# Patient Record
Sex: MALE | Race: WHITE | NOT HISPANIC OR LATINO | Employment: FULL TIME | URBAN - METROPOLITAN AREA
[De-identification: names, ages, dates, MRNs, and addresses within clinical notes are randomized per-mention and may not be internally consistent; named-entity substitution may affect disease eponyms.]

---

## 2021-05-25 ENCOUNTER — RECORDS - HEALTHEAST (OUTPATIENT)
Dept: ADMINISTRATIVE | Facility: CLINIC | Age: 46
End: 2021-05-25

## 2022-10-12 ENCOUNTER — HOSPITAL ENCOUNTER (OUTPATIENT)
Dept: CT IMAGING | Facility: HOSPITAL | Age: 47
Discharge: HOME | End: 2022-10-12
Attending: UROLOGY | Admitting: UROLOGY
Payer: COMMERCIAL

## 2022-10-12 DIAGNOSIS — R10.2 PELVIC AND PERINEAL PAIN: ICD-10-CM

## 2022-10-12 PROCEDURE — 74177 CT ABD & PELVIS W/CONTRAST: CPT

## 2022-10-12 PROCEDURE — 255N000002 HC RX 255 OP 636: Performed by: UROLOGY

## 2022-10-12 RX ADMIN — IOHEXOL 100 ML: 350 INJECTION, SOLUTION INTRAVENOUS at 12:59

## 2022-10-21 RX ORDER — TERAZOSIN 10 MG/1
20 CAPSULE ORAL AT BEDTIME
COMMUNITY

## 2022-10-21 RX ORDER — SERTRALINE HYDROCHLORIDE 100 MG/1
200 TABLET, FILM COATED ORAL AT BEDTIME
COMMUNITY

## 2022-10-21 RX ORDER — PREDNISONE 10 MG/1
10 TABLET ORAL DAILY
COMMUNITY
End: 2024-04-17

## 2022-10-21 RX ORDER — MONTELUKAST SODIUM 10 MG/1
10 TABLET ORAL AT BEDTIME
COMMUNITY

## 2022-10-22 ENCOUNTER — LAB (OUTPATIENT)
Dept: LAB | Facility: CLINIC | Age: 47
End: 2022-10-22
Payer: COMMERCIAL

## 2022-10-22 DIAGNOSIS — Z20.822 ENCOUNTER FOR LABORATORY TESTING FOR COVID-19 VIRUS: ICD-10-CM

## 2022-10-22 PROCEDURE — U0005 INFEC AGEN DETEC AMPLI PROBE: HCPCS

## 2022-10-22 PROCEDURE — U0003 INFECTIOUS AGENT DETECTION BY NUCLEIC ACID (DNA OR RNA); SEVERE ACUTE RESPIRATORY SYNDROME CORONAVIRUS 2 (SARS-COV-2) (CORONAVIRUS DISEASE [COVID-19]), AMPLIFIED PROBE TECHNIQUE, MAKING USE OF HIGH THROUGHPUT TECHNOLOGIES AS DESCRIBED BY CMS-2020-01-R: HCPCS

## 2022-10-24 ENCOUNTER — ANESTHESIA (OUTPATIENT)
Dept: SURGERY | Facility: HOSPITAL | Age: 47
End: 2022-10-24
Payer: COMMERCIAL

## 2022-10-24 ENCOUNTER — HOSPITAL ENCOUNTER (OUTPATIENT)
Facility: HOSPITAL | Age: 47
Discharge: HOME | End: 2022-10-25
Attending: UROLOGY | Admitting: UROLOGY
Payer: COMMERCIAL

## 2022-10-24 ENCOUNTER — ANESTHESIA EVENT (OUTPATIENT)
Dept: SURGERY | Facility: HOSPITAL | Age: 47
End: 2022-10-24
Payer: COMMERCIAL

## 2022-10-24 LAB — SARS-COV-2 RNA RESP QL NAA+PROBE: NEGATIVE

## 2022-10-24 PROCEDURE — 250N000013 HC RX MED GY IP 250 OP 250 PS 637: Performed by: UROLOGY

## 2022-10-24 PROCEDURE — 250N000009 HC RX 250: Performed by: UROLOGY

## 2022-10-24 PROCEDURE — 250N000011 HC RX IP 250 OP 636: Performed by: NURSE ANESTHETIST, CERTIFIED REGISTERED

## 2022-10-24 PROCEDURE — 250N000009 HC RX 250: Performed by: NURSE ANESTHETIST, CERTIFIED REGISTERED

## 2022-10-24 PROCEDURE — 250N000013 HC RX MED GY IP 250 OP 250 PS 637: Performed by: ANESTHESIOLOGY

## 2022-10-24 PROCEDURE — 250N000011 HC RX IP 250 OP 636: Performed by: UROLOGY

## 2022-10-24 PROCEDURE — 250N000025 HC SEVOFLURANE, PER MIN: Performed by: UROLOGY

## 2022-10-24 PROCEDURE — 250N000011 HC RX IP 250 OP 636: Performed by: NURSE PRACTITIONER

## 2022-10-24 PROCEDURE — 258N000003 HC RX IP 258 OP 636: Performed by: NURSE PRACTITIONER

## 2022-10-24 PROCEDURE — 258N000003 HC RX IP 258 OP 636: Performed by: NURSE ANESTHETIST, CERTIFIED REGISTERED

## 2022-10-24 PROCEDURE — 272N000001 HC OR GENERAL SUPPLY STERILE: Performed by: UROLOGY

## 2022-10-24 PROCEDURE — 258N000003 HC RX IP 258 OP 636: Performed by: ANESTHESIOLOGY

## 2022-10-24 PROCEDURE — 710N000009 HC RECOVERY PHASE 1, LEVEL 1, PER MIN: Performed by: UROLOGY

## 2022-10-24 PROCEDURE — 96372 THER/PROPH/DIAG INJ SC/IM: CPT | Performed by: UROLOGY

## 2022-10-24 PROCEDURE — 250N000011 HC RX IP 250 OP 636: Performed by: ANESTHESIOLOGY

## 2022-10-24 PROCEDURE — 360N000075 HC SURGERY LEVEL 2, PER MIN: Performed by: UROLOGY

## 2022-10-24 PROCEDURE — 999N000141 HC STATISTIC PRE-PROCEDURE NURSING ASSESSMENT: Performed by: UROLOGY

## 2022-10-24 PROCEDURE — 370N000017 HC ANESTHESIA TECHNICAL FEE, PER MIN: Performed by: UROLOGY

## 2022-10-24 RX ORDER — GLYCOPYRROLATE 0.2 MG/ML
INJECTION, SOLUTION INTRAMUSCULAR; INTRAVENOUS PRN
Status: DISCONTINUED | OUTPATIENT
Start: 2022-10-24 | End: 2022-10-24

## 2022-10-24 RX ORDER — LANOLIN ALCOHOL/MO/W.PET/CERES
3 CREAM (GRAM) TOPICAL
Status: DISCONTINUED | OUTPATIENT
Start: 2022-10-24 | End: 2022-10-25 | Stop reason: HOSPADM

## 2022-10-24 RX ORDER — HEPARIN SODIUM 5000 [USP'U]/.5ML
5000 INJECTION, SOLUTION INTRAVENOUS; SUBCUTANEOUS EVERY 12 HOURS
Status: DISCONTINUED | OUTPATIENT
Start: 2022-10-24 | End: 2022-10-25 | Stop reason: HOSPADM

## 2022-10-24 RX ORDER — LISINOPRIL 5 MG/1
10 TABLET ORAL AT BEDTIME
Status: DISCONTINUED | OUTPATIENT
Start: 2022-10-24 | End: 2022-10-25 | Stop reason: HOSPADM

## 2022-10-24 RX ORDER — ACETAMINOPHEN 325 MG/1
975 TABLET ORAL ONCE
Status: COMPLETED | OUTPATIENT
Start: 2022-10-24 | End: 2022-10-24

## 2022-10-24 RX ORDER — SODIUM CHLORIDE, SODIUM LACTATE, POTASSIUM CHLORIDE, CALCIUM CHLORIDE 600; 310; 30; 20 MG/100ML; MG/100ML; MG/100ML; MG/100ML
INJECTION, SOLUTION INTRAVENOUS CONTINUOUS
Status: DISCONTINUED | OUTPATIENT
Start: 2022-10-24 | End: 2022-10-24 | Stop reason: HOSPADM

## 2022-10-24 RX ORDER — NALOXONE HYDROCHLORIDE 1 MG/ML
0.2 INJECTION INTRAMUSCULAR; INTRAVENOUS; SUBCUTANEOUS
Status: DISCONTINUED | OUTPATIENT
Start: 2022-10-24 | End: 2022-10-25 | Stop reason: HOSPADM

## 2022-10-24 RX ORDER — KETOROLAC TROMETHAMINE 30 MG/ML
15 INJECTION, SOLUTION INTRAMUSCULAR; INTRAVENOUS EVERY 6 HOURS PRN
Status: DISCONTINUED | OUTPATIENT
Start: 2022-10-24 | End: 2022-10-24 | Stop reason: HOSPADM

## 2022-10-24 RX ORDER — LORAZEPAM 2 MG/ML
.5-1 INJECTION INTRAMUSCULAR
Status: DISCONTINUED | OUTPATIENT
Start: 2022-10-24 | End: 2022-10-24 | Stop reason: HOSPADM

## 2022-10-24 RX ORDER — NALOXONE HYDROCHLORIDE 1 MG/ML
0.4 INJECTION INTRAMUSCULAR; INTRAVENOUS; SUBCUTANEOUS
Status: DISCONTINUED | OUTPATIENT
Start: 2022-10-24 | End: 2022-10-25 | Stop reason: HOSPADM

## 2022-10-24 RX ORDER — DEXAMETHASONE SODIUM PHOSPHATE 10 MG/ML
INJECTION, SOLUTION INTRAMUSCULAR; INTRAVENOUS PRN
Status: DISCONTINUED | OUTPATIENT
Start: 2022-10-24 | End: 2022-10-24

## 2022-10-24 RX ORDER — OXYCODONE HYDROCHLORIDE 5 MG/1
5 TABLET ORAL EVERY 4 HOURS PRN
Status: DISCONTINUED | OUTPATIENT
Start: 2022-10-24 | End: 2022-10-24 | Stop reason: HOSPADM

## 2022-10-24 RX ORDER — TERAZOSIN 5 MG/1
20 CAPSULE ORAL AT BEDTIME
Status: DISCONTINUED | OUTPATIENT
Start: 2022-10-24 | End: 2022-10-25 | Stop reason: HOSPADM

## 2022-10-24 RX ORDER — MEPERIDINE HYDROCHLORIDE 25 MG/ML
12.5 INJECTION INTRAMUSCULAR; INTRAVENOUS; SUBCUTANEOUS
Status: DISCONTINUED | OUTPATIENT
Start: 2022-10-24 | End: 2022-10-24 | Stop reason: HOSPADM

## 2022-10-24 RX ORDER — LIDOCAINE 40 MG/G
CREAM TOPICAL
Status: DISCONTINUED | OUTPATIENT
Start: 2022-10-24 | End: 2022-10-24 | Stop reason: HOSPADM

## 2022-10-24 RX ORDER — HALOPERIDOL 5 MG/ML
1 INJECTION INTRAMUSCULAR
Status: DISCONTINUED | OUTPATIENT
Start: 2022-10-24 | End: 2022-10-24 | Stop reason: HOSPADM

## 2022-10-24 RX ORDER — MONTELUKAST SODIUM 10 MG/1
10 TABLET ORAL AT BEDTIME
Status: DISCONTINUED | OUTPATIENT
Start: 2022-10-24 | End: 2022-10-25 | Stop reason: HOSPADM

## 2022-10-24 RX ORDER — CLINDAMYCIN PHOSPHATE 900 MG/50ML
900 INJECTION, SOLUTION INTRAVENOUS SEE ADMIN INSTRUCTIONS
Status: DISCONTINUED | OUTPATIENT
Start: 2022-10-24 | End: 2022-10-24 | Stop reason: HOSPADM

## 2022-10-24 RX ORDER — PREDNISONE 5 MG/1
10 TABLET ORAL DAILY
Status: DISCONTINUED | OUTPATIENT
Start: 2022-10-25 | End: 2022-10-25 | Stop reason: HOSPADM

## 2022-10-24 RX ORDER — FENTANYL CITRATE 50 UG/ML
25 INJECTION, SOLUTION INTRAMUSCULAR; INTRAVENOUS EVERY 5 MIN PRN
Status: DISCONTINUED | OUTPATIENT
Start: 2022-10-24 | End: 2022-10-24 | Stop reason: HOSPADM

## 2022-10-24 RX ORDER — PROPOFOL 10 MG/ML
INJECTION, EMULSION INTRAVENOUS PRN
Status: DISCONTINUED | OUTPATIENT
Start: 2022-10-24 | End: 2022-10-24

## 2022-10-24 RX ORDER — ONDANSETRON 2 MG/ML
INJECTION INTRAMUSCULAR; INTRAVENOUS PRN
Status: DISCONTINUED | OUTPATIENT
Start: 2022-10-24 | End: 2022-10-24

## 2022-10-24 RX ORDER — TRAMADOL HYDROCHLORIDE 50 MG/1
50 TABLET ORAL EVERY 6 HOURS PRN
Status: DISCONTINUED | OUTPATIENT
Start: 2022-10-24 | End: 2022-10-25 | Stop reason: HOSPADM

## 2022-10-24 RX ORDER — FENTANYL CITRATE 50 UG/ML
INJECTION, SOLUTION INTRAMUSCULAR; INTRAVENOUS PRN
Status: DISCONTINUED | OUTPATIENT
Start: 2022-10-24 | End: 2022-10-24

## 2022-10-24 RX ORDER — LISINOPRIL 10 MG/1
10 TABLET ORAL AT BEDTIME
COMMUNITY

## 2022-10-24 RX ORDER — MAGNESIUM SULFATE 4 G/50ML
4 INJECTION INTRAVENOUS ONCE
Status: COMPLETED | OUTPATIENT
Start: 2022-10-24 | End: 2022-10-24

## 2022-10-24 RX ORDER — LIDOCAINE HYDROCHLORIDE 10 MG/ML
INJECTION, SOLUTION INFILTRATION; PERINEURAL PRN
Status: DISCONTINUED | OUTPATIENT
Start: 2022-10-24 | End: 2022-10-24

## 2022-10-24 RX ORDER — CLINDAMYCIN PHOSPHATE 900 MG/50ML
900 INJECTION, SOLUTION INTRAVENOUS
Status: COMPLETED | OUTPATIENT
Start: 2022-10-24 | End: 2022-10-24

## 2022-10-24 RX ORDER — FENTANYL CITRATE 50 UG/ML
25 INJECTION, SOLUTION INTRAMUSCULAR; INTRAVENOUS
Status: DISCONTINUED | OUTPATIENT
Start: 2022-10-24 | End: 2022-10-24 | Stop reason: HOSPADM

## 2022-10-24 RX ORDER — ONDANSETRON 2 MG/ML
4 INJECTION INTRAMUSCULAR; INTRAVENOUS EVERY 30 MIN PRN
Status: DISCONTINUED | OUTPATIENT
Start: 2022-10-24 | End: 2022-10-24 | Stop reason: HOSPADM

## 2022-10-24 RX ORDER — HYDROMORPHONE HYDROCHLORIDE 1 MG/ML
0.4 INJECTION, SOLUTION INTRAMUSCULAR; INTRAVENOUS; SUBCUTANEOUS EVERY 5 MIN PRN
Status: DISCONTINUED | OUTPATIENT
Start: 2022-10-24 | End: 2022-10-24 | Stop reason: HOSPADM

## 2022-10-24 RX ORDER — ONDANSETRON 4 MG/1
4 TABLET, ORALLY DISINTEGRATING ORAL EVERY 30 MIN PRN
Status: DISCONTINUED | OUTPATIENT
Start: 2022-10-24 | End: 2022-10-24 | Stop reason: HOSPADM

## 2022-10-24 RX ORDER — LIDOCAINE 40 MG/G
CREAM TOPICAL
Status: DISCONTINUED | OUTPATIENT
Start: 2022-10-24 | End: 2022-10-25 | Stop reason: HOSPADM

## 2022-10-24 RX ADMIN — GENTAMICIN SULFATE 380 MG: 40 INJECTION, SOLUTION INTRAMUSCULAR; INTRAVENOUS at 10:35

## 2022-10-24 RX ADMIN — PHENYLEPHRINE HYDROCHLORIDE 100 MCG: 10 INJECTION INTRAVENOUS at 12:12

## 2022-10-24 RX ADMIN — ACETAMINOPHEN 975 MG: 325 TABLET, FILM COATED ORAL at 10:38

## 2022-10-24 RX ADMIN — PROPOFOL 170 MG: 10 INJECTION, EMULSION INTRAVENOUS at 12:00

## 2022-10-24 RX ADMIN — FENTANYL CITRATE 50 MCG: 50 INJECTION, SOLUTION INTRAMUSCULAR; INTRAVENOUS at 12:03

## 2022-10-24 RX ADMIN — SERTRALINE HYDROCHLORIDE 100 MG: 50 TABLET ORAL at 21:43

## 2022-10-24 RX ADMIN — KETOROLAC TROMETHAMINE 15 MG: 30 INJECTION, SOLUTION INTRAMUSCULAR at 13:02

## 2022-10-24 RX ADMIN — SODIUM CHLORIDE, POTASSIUM CHLORIDE, SODIUM LACTATE AND CALCIUM CHLORIDE: 600; 310; 30; 20 INJECTION, SOLUTION INTRAVENOUS at 10:31

## 2022-10-24 RX ADMIN — FENTANYL CITRATE 50 MCG: 50 INJECTION, SOLUTION INTRAMUSCULAR; INTRAVENOUS at 12:13

## 2022-10-24 RX ADMIN — HEPARIN SODIUM 5000 UNITS: 10000 INJECTION, SOLUTION INTRAVENOUS; SUBCUTANEOUS at 21:43

## 2022-10-24 RX ADMIN — ONDANSETRON 4 MG: 2 INJECTION INTRAMUSCULAR; INTRAVENOUS at 12:18

## 2022-10-24 RX ADMIN — MIDAZOLAM 2 MG: 1 INJECTION INTRAMUSCULAR; INTRAVENOUS at 11:50

## 2022-10-24 RX ADMIN — TERAZOSIN HYDROCHLORIDE 20 MG: 5 CAPSULE ORAL at 21:49

## 2022-10-24 RX ADMIN — GLYCOPYRROLATE 0.2 MG: 0.2 INJECTION, SOLUTION INTRAMUSCULAR; INTRAVENOUS at 12:00

## 2022-10-24 RX ADMIN — LIDOCAINE HYDROCHLORIDE 3 ML: 10 INJECTION, SOLUTION INFILTRATION; PERINEURAL at 12:00

## 2022-10-24 RX ADMIN — DEXAMETHASONE SODIUM PHOSPHATE 10 MG: 10 INJECTION, SOLUTION INTRAMUSCULAR; INTRAVENOUS at 12:05

## 2022-10-24 RX ADMIN — MONTELUKAST SODIUM 10 MG: 10 TABLET, COATED ORAL at 21:43

## 2022-10-24 RX ADMIN — LISINOPRIL 10 MG: 5 TABLET ORAL at 21:43

## 2022-10-24 RX ADMIN — CLINDAMYCIN PHOSPHATE 900 MG: 900 INJECTION, SOLUTION INTRAVENOUS at 11:50

## 2022-10-24 RX ADMIN — MAGNESIUM SULFATE HEPTAHYDRATE 4 G: 80 INJECTION, SOLUTION INTRAVENOUS at 10:31

## 2022-10-24 ASSESSMENT — ACTIVITIES OF DAILY LIVING (ADL)
ADLS_ACUITY_SCORE: 18
WEAR_GLASSES_OR_BLIND: NO
ADLS_ACUITY_SCORE: 18
ADLS_ACUITY_SCORE: 18
CHANGE_IN_FUNCTIONAL_STATUS_SINCE_ONSET_OF_CURRENT_ILLNESS/INJURY: NO
DOING_ERRANDS_INDEPENDENTLY_DIFFICULTY: NO
ADLS_ACUITY_SCORE: 18
WALKING_OR_CLIMBING_STAIRS_DIFFICULTY: NO
ADLS_ACUITY_SCORE: 18
FALL_HISTORY_WITHIN_LAST_SIX_MONTHS: NO
DIFFICULTY_COMMUNICATING: NO
DIFFICULTY_EATING/SWALLOWING: NO
ADLS_ACUITY_SCORE: 18
CONCENTRATING,_REMEMBERING_OR_MAKING_DECISIONS_DIFFICULTY: NO
DRESSING/BATHING_DIFFICULTY: NO
TOILETING_ISSUES: NO
ADLS_ACUITY_SCORE: 18
HEARING_DIFFICULTY_OR_DEAF: NO

## 2022-10-24 NOTE — INTERVAL H&P NOTE
"I have reviewed the surgical (or preoperative) H&P that is linked to this encounter, and examined the patient. There are no significant changes    Clinical Conditions Present on Arrival:  Clinically Significant Risk Factors Present on Admission                    # Overweight: Estimated body mass index is 28.11 kg/m  as calculated from the following:    Height as of this encounter: 1.727 m (5' 8\").    Weight as of this encounter: 83.9 kg (184 lb 14.4 oz).       "

## 2022-10-24 NOTE — ANESTHESIA CARE TRANSFER NOTE
Patient: Dominick Harvey    Procedure: Procedure(s):  CYSTOSCOPY       Diagnosis: Pelvic and perineal pain [R10.2]  Diagnosis Additional Information: No value filed.    Anesthesia Type:   General     Note:    Oropharynx: oropharynx clear of all foreign objects and spontaneously breathing  Level of Consciousness: drowsy  Oxygen Supplementation: face mask  Level of Supplemental Oxygen (L/min / FiO2): 6  Independent Airway: airway patency satisfactory and stable  Dentition: dentition unchanged  Vital Signs Stable: post-procedure vital signs reviewed and stable  Report to RN Given: handoff report given  Patient transferred to: PACU    Handoff Report: Identifed the Patient, Identified the Reponsible Provider, Reviewed the pertinent medical history, Discussed the surgical course, Reviewed Intra-OP anesthesia mangement and issues during anesthesia, Set expectations for post-procedure period and Allowed opportunity for questions and acknowledgement of understanding      Vitals:  Vitals Value Taken Time   /68 10/24/22 1235   Temp 36.8  C (98.2  F) 10/24/22 1235   Pulse 67 10/24/22 1235   Resp 12 10/24/22 1235   SpO2 95 % 10/24/22 1235       Electronically Signed By: ЕКАТЕРИНА Mckeon CRNA  October 24, 2022  12:36 PM

## 2022-10-24 NOTE — OP NOTE
Location: [facility]     Patient Name: Dominick Harvey  Patient : 1975  Patient MRN: [id number]  Patient CSN: [id number]  Patient PCP: No Ref-Primary, Physician    Date of Service: 10/24/22     Pre-operative diagnosis: Pelvic pain    Post-operative diagnosis: Pelvic pain    Procedure:  Cystoscopy    Surgeon: Dr. Cesar Morocho    EBL: 0 mL    Anesthesia: General    Indication: 46 year old male with longstanding abdominal, pelvic, testicular, and scrotal pain that has been attributed to prostatitis and epididymitis in the past. He has not had an urologic work up.  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  Cystoscopy was recommended and he requested that this be done under anesthesia.   Risks, benefits, and alternatives to treatment were discussed with the patient and the patient elected to proceed.    Findings: His urethra was normal. His prostate was short and minimally obstructive.  His bladder was normal without tumors, stones, or trabeculation.  Bilateral ureteral orifices were normal.    Specimens: None    Procedure:  After written informed consent was obtained the patient was brought into the operating room.  The patient was properly identified prior to the procedure, received preprocedure antibiotics, and had sequential compression devices on the legs.  The patient was then induced under anesthesia.    The patient was placed in dorsal lithotomy position and prepped and draped in the usual sterile fashion.    Cystoscopy using the 19 Fr rigid cystoscope was performed. The urethra, prostate, and bladder was evaluated using the 30 degree and 70 degree lenses with the above findings.  I emptied his bladder and removed the cystoscope.    At this point, the procedure was completed.  Patient tolerated the procedure well.    Plan: To the floor for overnight observation (he doesn't live with anyone).  I suspect that his pain  is musculoskeletal in nature and will recommend physical therapy.    Cesar Morocho MD   12:28 PM

## 2022-10-24 NOTE — ANESTHESIA PREPROCEDURE EVALUATION
Anesthesia Pre-Procedure Evaluation    Patient: Dominick Harvey   MRN: [id number] : 1975        Procedure : Procedure(s):  CYSTOSCOPY          History reviewed. No pertinent past medical history.   History reviewed. No pertinent surgical history.   Allergies   Allergen Reactions     Keflex [Cephalexin Monohydrate] Rash      Social History     Tobacco Use     Smoking status: Never     Smokeless tobacco: Never   Substance Use Topics     Alcohol use: Not Currently      Wt Readings from Last 1 Encounters:   10/24/22 83.9 kg (184 lb 14.4 oz)        Anesthesia Evaluation   Pt has not had prior anesthetic         ROS/MED HX  ENT/Pulmonary:  - neg pulmonary ROS     Neurologic:  - neg neurologic ROS     Cardiovascular:  - neg cardiovascular ROS     METS/Exercise Tolerance:     Hematologic:  - neg hematologic  ROS     Musculoskeletal:  - neg musculoskeletal ROS     GI/Hepatic:  - neg GI/hepatic ROS     Renal/Genitourinary: Comment: Chronic prostatitis, bladder spasms.      Endo:  - neg endo ROS     Psychiatric/Substance Use:     (+) psychiatric history depression     Infectious Disease:  - neg infectious disease ROS     Malignancy:  - neg malignancy ROS     Other:  - neg other ROS          Physical Exam    Airway        Mallampati: II   TM distance: > 3 FB   Neck ROM: full     Respiratory Devices and Support         Dental  no notable dental history         Cardiovascular   cardiovascular exam normal          Pulmonary   pulmonary exam normal                OUTSIDE LABS:  CBC:   Lab Results   Component Value Date    WBC 9.0 2006    HGB 16.3 2006    HCT 48.4 2006     2006     BMP: No results found for: NA, POTASSIUM, CHLORIDE, CO2, BUN, CR, GLC  COAGS: No results found for: PTT, INR, FIBR  POC: No results found for: BGM, HCG, HCGS  HEPATIC: No results found for: ALBUMIN, PROTTOTAL, ALT, AST, GGT, ALKPHOS, BILITOTAL, BILIDIRECT, PHU  OTHER: No results found for: PH, LACT, A1C, RONALDO,  PHOS, MAG, LIPASE, AMYLASE, TSH, T4, T3, CRP, SED    Anesthesia Plan    ASA Status:  1   NPO Status:  NPO Appropriate    Anesthesia Type: General.     - Airway: LMA   Induction: Intravenous.   Maintenance: Balanced.        Consents    Anesthesia Plan(s) and associated risks, benefits, and realistic alternatives discussed. Questions answered and patient/representative(s) expressed understanding.    - Discussed:     - Discussed with:  Patient      - Extended Intubation/Ventilatory Support Discussed: No.      - Patient is DNR/DNI Status: No    Use of blood products discussed: No .     Postoperative Care    Pain management: IV analgesics, Oral pain medications, Multi-modal analgesia.   PONV prophylaxis: Dexamethasone or Solumedrol, Ondansetron (or other 5HT-3)     Comments:    Other Comments: Decadron, Zofran.  Diprivan infusion.  Toradol, Tylenol.  Ketamine (0.5 mg/kg).  Magnesium.            Neno Wilkerson MD

## 2022-10-24 NOTE — PHARMACY-ADMISSION MEDICATION HISTORY
Pharmacy Note - Admission Medication History   ______________________________________________________________________    Prior To Admission (PTA) med list completed and updated in EMR.       PTA Med List   Medication Sig Last Dose     lisinopril (ZESTRIL) 10 MG tablet Take 10 mg by mouth At Bedtime 10/23/2022 at hs     montelukast (SINGULAIR) 10 MG tablet Take 10 mg by mouth At Bedtime 10/23/2022 at hs     predniSONE (DELTASONE) 10 MG tablet Take 10 mg by mouth daily Weaning off 10/23/2022 at am     sertraline (ZOLOFT) 100 MG tablet Take 200 mg by mouth At Bedtime 10/23/2022 at hs     terazosin (HYTRIN) 10 MG capsule Take 20 mg by mouth At Bedtime 10/23/2022 at hs       Information source(s): Patient, Clinic records and [facility]    Method of interview communication: in-person    Patient was asked about OTC/herbal products specifically.  PTA med list reflects this.    Based on the pharmacist's assessment, the PTA med list information appears reliable    Allergies were reviewed, assessed, and updated with the patient.      Patient does not use any multi-dose medications prior to admission.     Thank you for the opportunity to participate in the care of this patient.      Stevo Dillon [facility]     10/24/2022     10:13 AM

## 2022-10-24 NOTE — ANESTHESIA PROCEDURE NOTES
Airway       Patient location during procedure: OR  Staff -        CRNA: Eliz Apodaca APRN CRNA       Performed By: CRNA  Consent for Airway        Urgency: elective  Indications and Patient Condition       Indications for airway management: carri-procedural       Induction type:intravenous       Mask difficulty assessment: 1 - vent by mask    Final Airway Details       Final airway type: supraglottic airway    Supraglottic Airway Details        Type: LMA       LMA size: 5    Post intubation assessment        Placement verified by: capnometry and chest rise        Number of attempts at approach: 1       Number of other approaches attempted: 0       Ease of procedure: easy       Dentition: Intact and Unchanged

## 2022-10-24 NOTE — ANESTHESIA POSTPROCEDURE EVALUATION
Patient: Dominick Harvey    Procedure: Procedure(s):  CYSTOSCOPY       Anesthesia Type:  General    Note:  Disposition: Outpatient   Postop Pain Control: Uneventful            Sign Out: Well controlled pain   PONV: No   Neuro/Psych: Uneventful            Sign Out: Acceptable/Baseline neuro status   Airway/Respiratory: Uneventful            Sign Out: Acceptable/Baseline resp. status   CV/Hemodynamics: Uneventful            Sign Out: Acceptable CV status; No obvious hypovolemia; No obvious fluid overload   Other NRE: NONE   DID A NON-ROUTINE EVENT OCCUR? No    Event details/Postop Comments:  Doing well and recovering nicely.  No complaints.           Last vitals:  Vitals Value Taken Time   /73 10/24/22 1315   Temp 36.8  C (98.2  F) 10/24/22 1235   Pulse 68 10/24/22 1325   Resp 8 10/24/22 1325   SpO2 93 % 10/24/22 1325   Vitals shown include unvalidated device data.    Electronically Signed By: Neno Wilkerson MD  October 24, 2022  1:27 PM

## 2022-10-24 NOTE — LETTER
[address] [address]  Phone: [phone]  Fax: [phone]    October 25, 2022        Dominick Harvey  [address] [place] [id number]          To whom it may concern:    RE: Dominick Harvey    Patient was seen and treated today at [facility] 10/24-10/25/22. Mr. Harvey may return to work without restrictions.     Please contact me for questions or concerns.      Sincerely,        Beny Gonsalves, APRN, CNP  [phone]

## 2022-10-25 VITALS
DIASTOLIC BLOOD PRESSURE: 60 MMHG | WEIGHT: 184.9 LBS | SYSTOLIC BLOOD PRESSURE: 124 MMHG | HEART RATE: 67 BPM | TEMPERATURE: 97.8 F | BODY MASS INDEX: 28.02 KG/M2 | HEIGHT: 68 IN | RESPIRATION RATE: 20 BRPM | OXYGEN SATURATION: 97 %

## 2022-10-25 LAB — GLUCOSE BLDC GLUCOMTR-MCNC: 130 MG/DL (ref 70–99)

## 2022-10-25 PROCEDURE — 82962 GLUCOSE BLOOD TEST: CPT

## 2022-10-25 PROCEDURE — 250N000012 HC RX MED GY IP 250 OP 636 PS 637: Performed by: UROLOGY

## 2022-10-25 PROCEDURE — 250N000013 HC RX MED GY IP 250 OP 250 PS 637: Performed by: STUDENT IN AN ORGANIZED HEALTH CARE EDUCATION/TRAINING PROGRAM

## 2022-10-25 RX ADMIN — Medication 3 MG: at 00:35

## 2022-10-25 RX ADMIN — PREDNISONE 10 MG: 5 TABLET ORAL at 09:49

## 2022-10-25 ASSESSMENT — ACTIVITIES OF DAILY LIVING (ADL)
ADLS_ACUITY_SCORE: 18

## 2022-10-25 NOTE — DISCHARGE SUMMARY
[facility] DISCHARGE SUMMARY    Name: Dominick Harvey  : 1975  MRN: [id number]  PCP: No Ref-Primary, Physician    Place of service: [facility]    Admission date: 10/24/2022   Discharge date: 10/25/2022     Surgeon: Dr Cesar Morocho     Surgical Procedure:  Cystoscopy     Date of surgery: 10/24/2022    Principal Diagnosis at Discharge:   Pelvic and perineal pain [R10.2]    Other diagnosis addressed during hospitalization:  None    Consults:  None    Diagnostic Studies :  None    Complications while in the Hospital:  None    Physical Exam:  Temp: 97.8  F (36.6  C) Temp src: Oral BP: 124/60 Pulse: 67   Resp: 20 SpO2: 97 % O2 Device: None (Room air) Oxygen Delivery: 8 LPM    Gen: nad, alert  Neuro: A&O x 3. Moving all extremities equally.   Resp: Reg resp rate/depth.   Abdomen: Soft, not significantly tender.   : penis and scrotum without erythema or edema  LE: CWMS intact.    Brief history of hospital course:  This is a 46 year old male admitted for Pelvic and perineal pain [R10.2]. Patient underwent above procedure. Patient tolerated the procedure well. Post operative course was unremarkable. By the day of discharge, the patient was ambulatory, tolerating diet, without pain, labs and vitals stable, voiding without difficulty.     Labs:  None     DISPOSITION: Home    DISCHARGE CONDITION: Good/Stable    DISCHARGE MEDICATIONS:      Review of your medicines      CONTINUE these medicines which have NOT CHANGED      Dose / Directions   lisinopril 10 MG tablet  Commonly known as: ZESTRIL      Dose: 10 mg  Take 10 mg by mouth At Bedtime  Refills: 0     montelukast 10 MG tablet  Commonly known as: SINGULAIR      Dose: 10 mg  Take 10 mg by mouth At Bedtime  Refills: 0     predniSONE 10 MG tablet  Commonly known as: DELTASONE      Dose: 10 mg  Take 10 mg by mouth daily Weaning off  Refills: 0     sertraline 100 MG tablet  Commonly known as: ZOLOFT      Dose: 200 mg  Take 200 mg by mouth At  Bedtime  Refills: 0     terazosin 10 MG capsule  Commonly known as: HYTRIN      Dose: 20 mg  Take 20 mg by mouth At Bedtime  Refills: 0            DISCHARGE PLAN:   - Follow up with Dr Cesar Morocho as needed. [facility] and/or PT will call you to arrange pelvic floor physical therapy.   - Follow up with PCP as needed, discuss screening colonoscopy.   - Resume home medications.   - Physical activity: Resume normal activities.   - Diet: Regular diet.   - Medication: please review discharge Med req/AVS  - Warning signs discussed with patient about when to call the clinic/hospital  - All questions and concerns were answered for the patient prior to discharge  - Patient verbalized understanding.     Discussed patient with Dr Cesar Morocho on day of discharge.     Beny Gonsalves APRN, CNP  [facility]   [phone]     I saw the patient on the date of discharge  Total time spent for discharge on date of discharge: 20 minutes    Physician(s) in addition to primary physician who should receive a copy:  CC1: No Ref-Primary, Physician           ?

## 2022-10-25 NOTE — PLAN OF CARE
"Goal Outcome Evaluation:               VSS.  Pain with voiding.  States \" feels like razor blades\". Monitoring output. IV SL.  Tolerating diet. Melatonin for sleep.  Care plan reviewed.   Problem: Plan of Care - These are the overarching goals to be used throughout the patient stay.    Goal: Plan of Care Review  Description: The Plan of Care Review/Shift note should be completed every shift.  The Outcome Evaluation is a brief statement about your assessment that the patient is improving, declining, or no change.  This information will be displayed automatically on your shift note.  Outcome: Progressing  Goal: Patient-Specific Goal (Individualized)  Description: You can add care plan individualizations to a care plan. Examples of Individualization might be:  \"Parent requests to be called daily at 9am for status\", \"I have a hard time hearing out of my right ear\", or \"Do not touch me to wake me up as it startles me\".  Outcome: Progressing  Goal: Absence of Hospital-Acquired Illness or Injury  Outcome: Progressing  Intervention: Identify and Manage Fall Risk  Recent Flowsheet Documentation  Taken 10/25/2022 0000 by Maggie Herman RN  Safety Promotion/Fall Prevention: nonskid shoes/slippers when out of bed  Intervention: Prevent Skin Injury  Recent Flowsheet Documentation  Taken 10/25/2022 0000 by Maggie Herman RN  Body Position: position changed independently  Goal: Optimal Comfort and Wellbeing  Outcome: Progressing  Goal: Readiness for Transition of Care  Outcome: Progressing     Problem: Pain Acute  Goal: Optimal Pain Control and Function  Outcome: Progressing            "

## 2022-10-25 NOTE — PLAN OF CARE
Patient anxious to discharge.  Requesting md be notified.  Urology called will come see patient soon.

## 2022-10-25 NOTE — PLAN OF CARE
Goal Outcome Evaluation:      Plan of Care Reviewed With: patient    Overall Patient Progress: improvingOverall Patient Progress: improving    Outcome Evaluation: Pt voiding adequately.    Pt voiding in urinal 1150 mls this shift of clear samia urine. Pt tolerated reg diet and ordered independently ate 100%. Pt able to ambulate in the halls independently.

## 2022-10-25 NOTE — CARE PLAN
RN  Discussed discharge instructions with patient.  Patient verbalized understanding and voiced no concerns at this time.  AVS provided to patient.  Belongings returned.  No significant changes in presentation.  Patient discharged to home with family.

## 2024-04-17 ENCOUNTER — HOSPITAL ENCOUNTER (EMERGENCY)
Facility: CLINIC | Age: 49
Discharge: HOME | End: 2024-04-17
Attending: EMERGENCY MEDICINE | Admitting: EMERGENCY MEDICINE
Payer: COMMERCIAL

## 2024-04-17 VITALS
HEART RATE: 67 BPM | OXYGEN SATURATION: 97 % | SYSTOLIC BLOOD PRESSURE: 145 MMHG | RESPIRATION RATE: 16 BRPM | DIASTOLIC BLOOD PRESSURE: 76 MMHG | TEMPERATURE: 98.5 F

## 2024-04-17 DIAGNOSIS — M25.552 HIP PAIN, LEFT: ICD-10-CM

## 2024-04-17 PROBLEM — R41.9 COGNITIVE COMPLAINTS: Status: ACTIVE | Noted: 2018-03-16

## 2024-04-17 PROBLEM — F51.01 PRIMARY INSOMNIA: Status: ACTIVE | Noted: 2024-04-17

## 2024-04-17 PROBLEM — G89.29 OTHER CHRONIC PAIN: Status: ACTIVE | Noted: 2024-04-17

## 2024-04-17 PROBLEM — F07.81 POST CONCUSSIVE SYNDROME: Status: ACTIVE | Noted: 2018-03-16

## 2024-04-17 PROBLEM — R10.2 PELVIC AND PERINEAL PAIN: Status: ACTIVE | Noted: 2022-10-07

## 2024-04-17 PROBLEM — F43.9 STRESS: Status: ACTIVE | Noted: 2018-03-16

## 2024-04-17 PROBLEM — N50.819 PAIN IN TESTICLE: Status: ACTIVE | Noted: 2022-10-07

## 2024-04-17 PROBLEM — R36.9 URETHRAL DISCHARGE: Status: ACTIVE | Noted: 2024-04-17

## 2024-04-17 PROBLEM — Q89.01 ASPLENIA: Status: ACTIVE | Noted: 2019-10-08

## 2024-04-17 PROBLEM — I10 ESSENTIAL HYPERTENSION: Status: ACTIVE | Noted: 2024-04-17

## 2024-04-17 PROBLEM — Z63.6 CAREGIVER BURDEN: Status: ACTIVE | Noted: 2018-11-02

## 2024-04-17 PROBLEM — Z72.820 POOR SLEEP: Status: ACTIVE | Noted: 2018-03-16

## 2024-04-17 PROCEDURE — 96372 THER/PROPH/DIAG INJ SC/IM: CPT

## 2024-04-17 PROCEDURE — 99284 EMERGENCY DEPT VISIT MOD MDM: CPT

## 2024-04-17 PROCEDURE — 250N000011 HC RX IP 250 OP 636

## 2024-04-17 RX ORDER — KETOROLAC TROMETHAMINE 30 MG/ML
30 INJECTION, SOLUTION INTRAMUSCULAR; INTRAVENOUS ONCE
Status: COMPLETED | OUTPATIENT
Start: 2024-04-17 | End: 2024-04-17

## 2024-04-17 RX ORDER — PREDNISONE 10 MG/1
TABLET ORAL
Qty: 74 TABLET | Refills: 0 | Status: SHIPPED | OUTPATIENT
Start: 2024-04-17 | End: 2024-05-22

## 2024-04-17 RX ADMIN — KETOROLAC TROMETHAMINE 30 MG: 30 INJECTION, SOLUTION INTRAMUSCULAR at 11:03

## 2024-04-17 ASSESSMENT — COLUMBIA-SUICIDE SEVERITY RATING SCALE - C-SSRS
1. IN THE PAST MONTH, HAVE YOU WISHED YOU WERE DEAD OR WISHED YOU COULD GO TO SLEEP AND NOT WAKE UP?: NO
6. HAVE YOU EVER DONE ANYTHING, STARTED TO DO ANYTHING, OR PREPARED TO DO ANYTHING TO END YOUR LIFE?: NO
2. HAVE YOU ACTUALLY HAD ANY THOUGHTS OF KILLING YOURSELF IN THE PAST MONTH?: NO

## 2024-04-17 ASSESSMENT — ACTIVITIES OF DAILY LIVING (ADL)
ADLS_ACUITY_SCORE: 33
ADLS_ACUITY_SCORE: 35

## 2024-04-17 NOTE — DISCHARGE INSTRUCTIONS
You were seen in the Emergency Room for L hip pain. You received pain medications and improved. We will send you home with a steroid taper dose, in which you will take 40mg for 7 days, then 30 mg for 7 days, then 20mg for 7 days, then 10mg for 7 days, then 5mg for 7 days. Please follow-up with your spine specialist and primary care doctor as needed.

## 2024-04-17 NOTE — ED TRIAGE NOTES
Chronic pain that is flared up, was given a taper of prednisone. Was unable to stand this morning due to the pain. Will be seeing a MD spine specialist tomorrow. Requesting an MRI.      JINA GRIGSBY RN       Triage Assessment (Adult)       Row Name 04/17/24 0928          Triage Assessment    Airway WDL WDL        Respiratory WDL    Respiratory WDL WDL        Skin Circulation/Temperature WDL    Skin Circulation/Temperature WDL WDL        Cardiac WDL    Cardiac WDL WDL        Peripheral/Neurovascular WDL    Peripheral Neurovascular WDL X  tingling in the left foot at times        Cognitive/Neuro/Behavioral WDL    Cognitive/Neuro/Behavioral WDL WDL

## 2024-04-17 NOTE — ED PROVIDER NOTES
EMERGENCY DEPARTMENT ENCOUNTER      NAME: Dominick Harvey  AGE: 48 year old male  YOB: 1975  MRN: [id number]  EVALUATION DATE & TIME: 2024  9:34 AM    PCP: No Ref-Primary, Physician    ED PROVIDER: Anthony Goodson D.O.      Chief Complaint   Patient presents with    Hip Pain       FINAL IMPRESSION:  1. Hip pain, left        ED COURSE & MEDICAL DECISION MAKIN:37 AM I met with the patient to gather history and to perform my initial exam. I discussed the plan for care while in the Emergency Department.         Pertinent Labs & Imaging studies reviewed. (See chart for details)  48 year old male presents to the Emergency Department for evaluation of chronic left hip pain.  No recent trauma do not find indication x-rays as fracture or dislocation are unlikely.  Patient has no fever, no swelling, no erythema over the joint area to suggest concern for septic arthritis.  Pain is primarily over the piriformis on exam, which she has a history of chronic inflammation.  Has done well on oral steroids in the past.  At this time I do not believe imaging is indicated but do believe that we can discharge on a tapering dose steroids and have follow-up as an outpatient.  Patient was comfortable with this plan.  Return precautions were discussed.    Medical Decision Making  Obtained supplemental history:Supplemental history obtained?: No  Reviewed external records: External records reviewed?: Outpatient Record: [facility]  Care impacted by chronic illness:Hypertension  Care significantly affected by social determinants of health:N/A  Did you consider but not order tests?: Work up considered but not performed and documented in chart, if applicable  Did you interpret images independently?: Independent interpretation of ECG and images noted in documentation, when applicable.  Consultation discussion with other provider:Did you involve another provider (consultant, ,  "pharmacy, etc.)?: No  Discharge. I prescribed additional prescription strength medication(s) as charted. See documentation for any additional details.    At the conclusion of the encounter I discussed the results of all of the tests and the disposition. The questions were answered. The patient or family acknowledged understanding and was agreeable with the care plan.        [facility]    Patient information was obtained from: patient     Use of : N/A        Dominick Harvey is a 48 year old male who presents with hip pain.    The patient has chronic hip pain with a family history of \"inflammatory disease\". For the last month the left hip pain  has been worse than usual. His previous PCP had prescribed him steroids when he had flare ups so he went to his new PCP to request this. They prescribed him a steroid taper that started at 40 mg and went down to 20 mg a few days ago. When he went down to the 20 mg dose he had 10/10 hip pain. This morning, he took 40 mg and his pain dropped down to a 7/10.     He denies numbness, tingling, or any other complaints at this time.     Per Chart Review:   The patient was seen on 4/10/2024 by his PCP at [facility] for his chronic left hip pain. He was started on a Prednisone taper.       REVIEW OF SYSTEMS  Constitutional:  Denies fever, chills, weight loss or weakness  Eyes:  No pain, discharge, redness  HENT:  Denies sore throat, ear pain, congestion  Respiratory: No SOB, wheeze or cough  Cardiovascular:  No CP, palpitations  GI:  Denies abdominal pain, nausea, vomiting, diarrhea  : Denies dysuria, hematuria  Musculoskeletal:  Denies any new swelling or loss of function. Positive for left hip pain.  Skin:  Denies rash, pallor  Neurologic:  Denies headache, focal weakness or sensory changes  Lymph: Denies swollen nodes    All other systems negative unless noted in HPI.    PAST MEDICAL HISTORY:  No past medical history on file.    PAST SURGICAL " HISTORY:  Past Surgical History:   Procedure Laterality Date    CYSTOSCOPY N/A 10/24/2022    Procedure: CYSTOSCOPY;  Surgeon: Cesar Morocho MD;  Location: [facility] Main OR         CURRENT MEDICATIONS:    No current facility-administered medications for this encounter.     Current Outpatient Medications   Medication Sig Dispense Refill    predniSONE (DELTASONE) 10 MG tablet Take 4 tablets (40 mg) by mouth daily for 7 days, THEN 3 tablets (30 mg) daily for 7 days, THEN 2 tablets (20 mg) daily for 7 days, THEN 1 tablet (10 mg) daily for 7 days, THEN 0.5 tablets (5 mg) daily for 7 days. 74 tablet 0    lisinopril (ZESTRIL) 10 MG tablet Take 10 mg by mouth At Bedtime      montelukast (SINGULAIR) 10 MG tablet Take 10 mg by mouth At Bedtime      sertraline (ZOLOFT) 100 MG tablet Take 200 mg by mouth At Bedtime      terazosin (HYTRIN) 10 MG capsule Take 20 mg by mouth At Bedtime           ALLERGIES:  Allergies   Allergen Reactions    Keflex [Cephalexin Monohydrate] Rash     Occurred as a child. Was not very severe per patient testimonial        FAMILY HISTORY:  No family history on file.    SOCIAL HISTORY:  Social History     Socioeconomic History    Marital status: Single   Tobacco Use    Smoking status: Never    Smokeless tobacco: Never   Substance and Sexual Activity    Alcohol use: Not Currently    Drug use: Never       VITALS:  Patient Vitals for the past 24 hrs:   BP Temp Temp src Pulse Resp SpO2   04/17/24 1107 (!) 145/76 -- -- 67 16 97 %   04/17/24 0926 (!) 146/85 98.5  F (36.9  C) Oral 106 16 97 %       PHYSICAL EXAM    VITAL SIGNS: BP (!) 145/76   Pulse 67   Temp 98.5  F (36.9  C) (Oral)   Resp 16   SpO2 97%   Vitals: BP (!) 145/76   Pulse 67   Temp 98.5  F (36.9  C) (Oral)   Resp 16   SpO2 97%   General: Appears in no acute distress, awake, alert, interactive.  Eyes: Conjunctivae non-injected. Sclera anicteric.  HENT: Atraumatic, normocephalic  Neck: Supple, normal ROM  Respiratory/Chest:  Respiration unlabored, no tachypnea  Abdomen: non distended  Musculoskeletal: Normal extremities. No edema or erythema. Tenderness to left buttock piriformis.  Full passive range of motion with minimal discomfort  Skin: Normal color. No rash or diaphoresis.  Neurologic: Alert and oriented, face symmetric, moves all extremities spontaneously. Speech clear. 5/5 motor tone in bilateral legs. No deficits.  Psychiatric:  Affect normal, Judgment normal, Mood normal.       LABS  No results found for this or any previous visit (from the past 24 hour(s)).      RADIOLOGY  No orders to display              MEDICATIONS GIVEN IN THE EMERGENCY:  Medications   ketorolac (TORADOL) injection 30 mg (30 mg Intramuscular $Given 4/17/24 1103)       NEW PRESCRIPTIONS STARTED AT TODAY'S ER VISIT  Discharge Medication List as of 4/17/2024 11:46 AM           I, Trevor Pastor , am serving as a scribe to document services personally performed by Anthony Goodson D.O., based on my observations and the provider's statements to me.  I, Anthony Goodson D.O., attest that Trevor Pastor is acting in a scribe capacity, has observed my performance of the services and has documented them in accordance with my direction.     Anthony Goodson D.O.  Emergency Medicine  [facility] EMERGENCY ROOM  [address] [id number] [phone]  Dept: [phone]       Anthony Goodson,   04/17/24 1164

## 2024-04-17 NOTE — ED PROVIDER NOTES
EMERGENCY DEPARTMENT ENCOUNTER      NAME: Dominick Harvey  AGE: 48 year old male  YOB: 1975  MRN: [id number]  EVALUATION DATE & TIME: 4/17/2024  9:34 AM    PCP: No Ref-Primary, Physician    ED PROVIDER: Duane Tate MD      Chief Complaint   Patient presents with    Hip Pain       FINAL IMPRESSION:  1. Hip pain, left          ED COURSE & MEDICAL DECISION MAKING:    Pertinent Labs & Imaging studies reviewed. (See chart for details)  48 year old male presents to the Emergency Department for evaluation of acute on chronic L hip pain. Previously has controlled flares with 40mg prednisone. Was recently seen by PCP for steroid taper and noted improvement of hip pain at 40mg, however worsened when he tapered down to 20mg. Went back to 40mg prior to ED arrival with some improvement of pain. Suspect this pain is L piriformis syndrome or SI joint pain. No radiculopathy today. Low concern for cauda equina or other urgent pathology given negative red flag symptoms. No imaging warranted. Will treat pain with 1 time dose of toradol and send home with weekly steroid taper of 40mg/30mg/20mg/10mg/5mg. Patient in agreement of this.   9:34AM: met with Patient and obtained history and exam  11:27AM: check in on patient; pain improved. Will discharge home with steroid taper as above.       At the conclusion of the encounter I discussed the results of all of the tests and the disposition. The questions were answered. The patient or family acknowledged understanding and was agreeable with the care plan.     Patient seen and discussed with Dr. Anthony Tate MD, MPH   PGY-1  [facility]   Emergency Medicine     MEDICATIONS GIVEN IN THE EMERGENCY:  Medications   ketorolac (TORADOL) injection 30 mg (30 mg Intramuscular $Given 4/17/24 1105)       NEW PRESCRIPTIONS STARTED AT TODAY'S ER VISIT  New Prescriptions    PREDNISONE (DELTASONE) 10 MG TABLET    Take 4 tablets (40 mg) by  mouth daily for 7 days, THEN 3 tablets (30 mg) daily for 7 days, THEN 2 tablets (20 mg) daily for 7 days, THEN 1 tablet (10 mg) daily for 7 days, THEN 0.5 tablets (5 mg) daily for 7 days.          =================================================================    HPI    Dominick Harvey is a 48 year old male with a pertinent history of chronic left hip pain who presents to this ED  for evaluation of ongoing left hip pain. Reports he's had a chronic issue with his L hip pain. Has an inflammatory condition that runs in the family and would frequently get flare-ups of his hip pain that was previously controlled with prednisone PRN. Reports he's been having worsening L hip pain for the past month. Has tried massage therapy and chiropractor with some relief. Went to his new PCP 1 week ago and was prescribed steroid burst taper. Pain improved with the 40mg dose, however when he tapered down to 20mg, his L hip pain worsened. Notes it being deep in the L hip/buttock region. No numbness or tingling, no radiation down leg, no loss of bowel or urine, no recent fevers or chills. He self-increased his steroids back to 40mg this morning with some relief. Has an appointment with spine specialist tomorrow.     REVIEW OF SYSTEMS   Review of Systems   Negative unless stated in HPI     PAST MEDICAL HISTORY:  No past medical history on file.    PAST SURGICAL HISTORY:  Past Surgical History:   Procedure Laterality Date    CYSTOSCOPY N/A 10/24/2022    Procedure: CYSTOSCOPY;  Surgeon: Cesar Morocho MD;  Location: [facility] OR       CURRENT MEDICATIONS:    predniSONE (DELTASONE) 10 MG tablet  lisinopril (ZESTRIL) 10 MG tablet  montelukast (SINGULAIR) 10 MG tablet  sertraline (ZOLOFT) 100 MG tablet  terazosin (HYTRIN) 10 MG capsule        ALLERGIES:  Allergies   Allergen Reactions    Keflex [Cephalexin Monohydrate] Rash     Occurred as a child. Was not very severe per patient testimonial        FAMILY HISTORY:  No family  history on file.    SOCIAL HISTORY:   Social History     Socioeconomic History    Marital status: Single   Tobacco Use    Smoking status: Never    Smokeless tobacco: Never   Substance and Sexual Activity    Alcohol use: Not Currently    Drug use: Never       VITALS:  BP (!) 145/76   Pulse 67   Temp 98.5  F (36.9  C) (Oral)   Resp 16   SpO2 97%     PHYSICAL EXAM    Constitutional: Well developed, Well nourished, NAD  Respiratory: Normal breath sounds, No respiratory distress, No wheezing, Speaks full sentences easily.   Cardiovascular: Normal heart rate, Regular rhythm, No murmurs, No rubs, No gallops.   Musculoskeletal: 2+ DP pulses. No edema. Point tenderness deep palpation to L piriformis area; 30 degrees with passive straight leg raise; able to raise leg to 70 degrees. Strength and sensation intact bilaterally.   Integument: Warm, Dry, No erythema, No rash.   Neurologic: Alert & oriented x 3, Normal motor function, Normal sensory function, No focal deficits noted. Normal gait.       LAB:  All pertinent labs reviewed and interpreted.         I have independently reviewed and interpreted the EKG(s) documented above.      Duane Tate MD  [facility] EMERGENCY ROOM  [address] [id number] [phone]       Duane Tate MD  Resident  04/17/24 4968

## 2025-06-20 ENCOUNTER — MEDICAL CORRESPONDENCE (OUTPATIENT)
Dept: HEALTH INFORMATION MANAGEMENT | Facility: CLINIC | Age: 50
End: 2025-06-20
Payer: COMMERCIAL

## 2025-06-25 ENCOUNTER — ANCILLARY PROCEDURE (OUTPATIENT)
Dept: GENERAL RADIOLOGY | Facility: CLINIC | Age: 50
End: 2025-06-25
Attending: ORTHOPAEDIC SURGERY
Payer: COMMERCIAL

## 2025-06-25 DIAGNOSIS — M25.531 RIGHT WRIST PAIN: ICD-10-CM

## 2025-07-23 ENCOUNTER — ANCILLARY PROCEDURE (OUTPATIENT)
Dept: GENERAL RADIOLOGY | Facility: CLINIC | Age: 50
End: 2025-07-23
Attending: ORTHOPAEDIC SURGERY
Payer: COMMERCIAL

## 2025-07-23 DIAGNOSIS — S52.501A DISTAL RADIUS FRACTURE, RIGHT: ICD-10-CM

## 2025-08-25 ENCOUNTER — MEDICAL CORRESPONDENCE (OUTPATIENT)
Dept: HEALTH INFORMATION MANAGEMENT | Facility: CLINIC | Age: 50
End: 2025-08-25
Payer: COMMERCIAL

## 2025-08-27 ENCOUNTER — ANCILLARY PROCEDURE (OUTPATIENT)
Dept: GENERAL RADIOLOGY | Facility: CLINIC | Age: 50
End: 2025-08-27
Attending: ORTHOPAEDIC SURGERY
Payer: COMMERCIAL

## 2025-08-27 DIAGNOSIS — S52.501A DISTAL RADIUS FRACTURE, RIGHT: ICD-10-CM

## 2025-08-27 DIAGNOSIS — M25.531 RIGHT WRIST PAIN: ICD-10-CM

## (undated) DEVICE — GOWN LG DISP 9515

## (undated) DEVICE — PACK MINOR SINGLE BASIN SSK3001

## (undated) DEVICE — CUSTOM PACK CYSTO PREFERRED SOT5BCYHEA

## (undated) DEVICE — GLOVE BIOGEL PI ULTRATOUCH G SZ 7.5 42175

## (undated) DEVICE — GLOVE UNDER INDICATOR PI SZ 6.5 LF 41665

## (undated) DEVICE — KIT ENDO FIRST STEP DISINFECTANT 200ML W/POUCH EP-4

## (undated) DEVICE — GLOVE BIOGEL PI INDICATOR 8.0 LF 41680

## (undated) DEVICE — TUBING SUCTION MEDI-VAC 1/4"X20' N620A - HE

## (undated) DEVICE — GLOVE BIOGEL PI SZ 7.5 40875

## (undated) DEVICE — MAT FLOOR SURGICAL 40X38 0702140238

## (undated) DEVICE — PREP SCRUB SOL EXIDINE 4% CHG 4OZ 29002-404

## (undated) DEVICE — SUCTION MANIFOLD NEPTUNE 2 SYS 1 PORT 702-025-000

## (undated) DEVICE — SOL WATER IRRIG 1000ML BOTTLE 2F7114

## (undated) RX ORDER — FENTANYL CITRATE-0.9 % NACL/PF 10 MCG/ML
PLASTIC BAG, INJECTION (ML) INTRAVENOUS
Status: DISPENSED
Start: 2022-10-24

## (undated) RX ORDER — FENTANYL CITRATE 50 UG/ML
INJECTION, SOLUTION INTRAMUSCULAR; INTRAVENOUS
Status: DISPENSED
Start: 2022-10-24

## (undated) RX ORDER — KETAMINE HYDROCHLORIDE 10 MG/ML
INJECTION INTRAMUSCULAR; INTRAVENOUS
Status: DISPENSED
Start: 2022-10-24